# Patient Record
(demographics unavailable — no encounter records)

---

## 2024-12-03 NOTE — HISTORY OF PRESENT ILLNESS
[FreeTextEntry1] : Ms. Cook is a 62 year-old woman with a history of Graves' disease and osteopenia presenting for follow-up. I saw her for an initial visit in January 2021 and last in May 2024.  Graves' disease. She was diagnosed with Graves' disease in August 2020 on routine blood tests; she was asymptomatic. Thyroid function tests from the year prior were within range. She was started on methimazole 10 mg daily, adjusted to 10 mg 4 days/week and 5 mg 3 days/week in January 2021. We adjusted methimazole to 5 mg daily in April 2021; adjusted to 5 mg every other day in June 2021; and adjusted to every 3 days in March 2022. In June 2022 we discontinued methimazole. Thyroid ultrasound in December 2020 demonstrated a diffusely heterogenous and hyperemic thyroid gland consistent with Graves' disease and subcentimeter thyroid nodules not meeting criteria for biopsy. Thyroid ultrasound from May 2023 with stable subcentimeter nodules not meeting criteria for biopsy. No history of radiation exposure. Brother with history of hyperthyroidism status post subtotal thyroidectomy 40 years ago and nephew with type 1 diabetes mellitus.  Bone History Menopause: Around age 53 Osteoporosis on routine bone density testing significant for T-scores of -2.4 at the lumbar spine, -2.5 at the femoral neck, -1.9 at the total hip, and -1.2 at the distal radius Fracture history: Stress fracture in her foot related to running around 25 years ago Family history: No parental history of hip fracture; maternal grandmother with history of hip fracture Treatment:  Norethindrone/ethinyl estradiol 1 mg-5 mcg daily from around age 53 to October 2022; discontinued due to pulmonary embolism Zoledronic acid 5 mg IV in June 2024  Falls: No Height loss: No Kidney stones: No Dental health: Regular appointments, history of periodontal disease, no history of implants, no upcoming procedures planned Exercise: Walks, plays tennis, golf, bicycle Dairy intake: 2-3 servings daily (glass of milk daily, cup of yogurt daily, cheese a few times per week) Calcium supplements: 600 mg daily Multivitamin: None Vitamin D supplements: in calcium supplement  Osteoporosis risk factors include: Postmenopausal status,  race, prior fracture, falls, height loss, small thin bones, tobacco use, excessive alcohol, anorexia, family history, vitamin D deficiency, corticosteroid use, seizure medications, malabsorption, hyperparathyroidism, hyperthyroidism. NEGATIVE EXCEPT: Postmenopausal status,  race, history of hyperthyroidism  Interim History She received zoledronic acid 5 mg IV in June. Recent TSH within the goal range as below.  She feels well today. No acute issues. Her daughter was diagnosed with small fiber neuropathy, which has been life-altering.  Medical and surgical history, medications, allergies, social and family history reviewed and updated as needed.

## 2024-12-03 NOTE — PHYSICAL EXAM
[Alert] : alert [Healthy Appearance] : healthy appearance [No Acute Distress] : no acute distress [Normal Sclera/Conjunctiva] : normal sclera/conjunctiva [No Proptosis] : no proptosis [No Lid Lag] : no lid lag [Normal Hearing] : hearing was normal [No Respiratory Distress] : no respiratory distress [No Stigmata of Cushings Syndrome] : no stigmata of Cushings Syndrome [Normal Gait] : normal gait [Normal Insight/Judgement] : insight and judgment were intact [Kyphosis] : no kyphosis present [Acanthosis Nigricans] : no acanthosis nigricans [de-identified] : no moon facies, no supraclavicular fat pads

## 2024-12-03 NOTE — DATA REVIEWED
[FreeTextEntry1] : Laboratories (November 20, 2024) reviewed and significant for:  TSH 1.15 uIU/mL  Laboratories (April 25, 2024) reviewed and significant for: Calcium 10.1 mg/dL (albumin 4.2 g/dL) PTH 21 pg/mL BUN/creatinine 25/0.95 mg/dL (eGFR 68 mL/min) Alkaline phosphatase 89 U/L Phosphorus 4.4 mg/dL Magnesium 2.1 mg/dL TSH 1.44 uIU/mL Free T4 1.1 ng/dL Total T4 7.2 mcg/dL Total T3 87 ng/dL  Laboratories (December 5, 2023) reviewed and significant for: Unremarkable complete blood count Calcium 10.0 mg/dL (albumin 4.2 g/dL) PTH 21 pg/mL 25-hydroxyvitamin D 42 ng/mL BUN/creatinine 22/1.00 mg/dL (eGFR 64 mL/min) Alkaline phosphatase 85 U/L Magnesium 2.1 mg/dL TSH 1.36 uIU/mL Free T4 1.0 ng/dL Total T4 8.2 mcg/dL Total T3 90 ng/dL  Most recent bone mineral density Date: December 5, 2023 Source: [place] Site: [facility] [place]  Site BMD T-score Change previous Change baseline  Lumbar spine 0.778 -2.4  -8.7%* from previous in 2022  Femoral neck 0.573 -2.5  -1.0%  Total hip 0.706 -1.9  -0.6%  Distal radius 0.684 -0.2  -0.7%  DXA comments: *Significant change from previous; left hip values Vertebral fracture assessment without evidence of compression fractures  Thyroid ultrasound (May 25, 2023) reviewed and significant for: The thyroid parenchyma demonstrates increased heterogeneity compared to the prior study there are innumerable hypoechoic foci bilaterally.  ISTHMUS: The isthmus measures 0.4 cm  RIGHT LOBE: The right lobe measures 5.4 x 2.1 x 1.8 cm (longitudinal x AP x transverse). There is a stable 0.9 cm isoechoic nodular area in the lower pole, likely representing heterogeneous tissue.  LEFT LOBE: The left lobe measures 4.3 x 1.5 x 1.6 cm (longitudinal x AP x transverse). Stable 0.9 cm spongiform nodule in the upper pole.  IMPRESSION: Increased diffuse heterogeneity of the thyroid parenchyma.

## 2024-12-03 NOTE — ASSESSMENT
[FreeTextEntry1] : Osteoporosis. She has no history of fragility fracture. She had a history of hormone replacement therapy, discontinued in October 2022, and received zoledronic acid 5 mg IV in June 2024. We have discussed the potential benefits and risks of antiresorptive osteoporosis therapy, including but not limited to osteonecrosis of the jaw and atypical femoral fracture. We will consider a second dose of zoledronic acid therapy. Calcium 8957-6150 mg daily from diet and supplements (to be taken in divided doses as no more than 500-600 mg can be absorbed at one time) Continue current vitamin D regimen Diet, exercise and fall prevention discussed  Graves' disease. She was diagnosed with Graves' disease in August 2020 and started on methimazole at that time. We had titrated methimazole to 5 mg every 3 days and discontinued in June 2022. Recent laboratory results demonstrated normal TSH and T4/T3. Thyroid ultrasound in December 2020 demonstrated a diffusely heterogenous and hyperemic thyroid gland consistent with Graves' disease and subcentimeter thyroid nodules not meeting criteria for biopsy. Thyroid ultrasound from May 2023 with stable subcentimeter nodules not meeting criteria for biopsy.  Monitor TSH annually or earlier if clinically indicated Consider interval thyroid ultrasound in May 2026  Return to see me in 6 months or earlier as needed.  CC: Dr. Tiarra Cruz, Fax [phone] Dr. Gina Renee, Fax [phone]

## 2025-06-03 NOTE — DATA REVIEWED
[FreeTextEntry1] : Laboratories (May 19, 2025) reviewed and significant for: Calcium 9.8 mg/dL (albumin 4.4 g/dL) PTH 31 pg/mL 25-hydroxyvitamin D 44 ng/mL BUN/creatinine 19/0.82 mg/dL (eGFR 80 mL/min) Alkaline phosphatase 60 U/L Phosphorus 3.6 mg/dL Magnesium 2.2 mg/dL TSH 1.00 uIU/mL  Most recent bone mineral density Date: May 17, 2025 Source: [place] Site: [facility]  Site BMD T-score Change previous Change baseline  Lumbar spine 0.799 -2.3  +2.7%  Femoral neck 0.596 -2.3  +3.9%  Total hip 0.710 -1.9  +0.6%  Distal radius 0.652 -0.7  -4.7%*  DXA comments: *Significant change from previous; left hip and radius values Vertebral fracture assessment without evidence of compression fractures  Thyroid ultrasound (May 25, 2023) reviewed and significant for: The thyroid parenchyma demonstrates increased heterogeneity compared to the prior study there are innumerable hypoechoic foci bilaterally.  ISTHMUS: The isthmus measures 0.4 cm  RIGHT LOBE: The right lobe measures 5.4 x 2.1 x 1.8 cm (longitudinal x AP x transverse). There is a stable 0.9 cm isoechoic nodular area in the lower pole, likely representing heterogeneous tissue.  LEFT LOBE: The left lobe measures 4.3 x 1.5 x 1.6 cm (longitudinal x AP x transverse). Stable 0.9 cm spongiform nodule in the upper pole.  IMPRESSION: Increased diffuse heterogeneity of the thyroid parenchyma.

## 2025-06-03 NOTE — ASSESSMENT
[FreeTextEntry1] : Osteoporosis. She has no history of fragility fracture. She had a history of hormone replacement therapy, discontinued in October 2022, and received zoledronic acid 5 mg IV in June 2024. We have discussed the potential benefits and risks of antiresorptive osteoporosis therapy, including but not limited to osteonecrosis of the jaw and atypical femoral fracture. She is amenable to a second dose of zoledronic acid therapy. Zoledronic acid 5 mg IV Calcium 7413-3381 mg daily from diet and supplements (to be taken in divided doses as no more than 500-600 mg can be absorbed at one time); continue current regimen Continue current vitamin D regimen Diet, exercise and fall prevention discussed  Graves' disease. She was diagnosed with Graves' disease in August 2020 and started on methimazole at that time. We had titrated methimazole to 5 mg every 3 days and discontinued in June 2022. Recent laboratory results demonstrated normal TSH. Thyroid ultrasound in December 2020 demonstrated a diffusely heterogenous and hyperemic thyroid gland consistent with Graves' disease and subcentimeter thyroid nodules not meeting criteria for biopsy. Thyroid ultrasound from May 2023 with stable subcentimeter nodules not meeting criteria for biopsy.  Monitor TSH annually or earlier if clinically indicated Interval thyroid ultrasound in May 2026  Return to see me in 1 year or earlier as needed.  CC: Dr. Tiarra Cruz, Fax [phone] Dr. Gina Renee, Fax [phone]

## 2025-06-03 NOTE — PHYSICAL EXAM
[Alert] : alert [Healthy Appearance] : healthy appearance [No Acute Distress] : no acute distress [Normal Sclera/Conjunctiva] : normal sclera/conjunctiva [No Proptosis] : no proptosis [No Lid Lag] : no lid lag [Normal Hearing] : hearing was normal [No Respiratory Distress] : no respiratory distress [No Stigmata of Cushings Syndrome] : no stigmata of Cushings Syndrome [Normal Gait] : normal gait [Normal Insight/Judgement] : insight and judgment were intact [Kyphosis] : no kyphosis present [Acanthosis Nigricans] : no acanthosis nigricans [de-identified] : no moon facies, no supraclavicular fat pads
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
